# Patient Record
Sex: FEMALE | Race: BLACK OR AFRICAN AMERICAN | Employment: FULL TIME | URBAN - METROPOLITAN AREA
[De-identification: names, ages, dates, MRNs, and addresses within clinical notes are randomized per-mention and may not be internally consistent; named-entity substitution may affect disease eponyms.]

---

## 2017-02-25 ENCOUNTER — APPOINTMENT (OUTPATIENT)
Dept: ULTRASOUND IMAGING | Age: 32
End: 2017-02-25
Attending: PHYSICIAN ASSISTANT
Payer: SELF-PAY

## 2017-02-25 ENCOUNTER — HOSPITAL ENCOUNTER (EMERGENCY)
Age: 32
Discharge: HOME | End: 2017-02-25
Attending: EMERGENCY MEDICINE | Admitting: EMERGENCY MEDICINE
Payer: SELF-PAY

## 2017-02-25 VITALS
OXYGEN SATURATION: 100 % | DIASTOLIC BLOOD PRESSURE: 61 MMHG | SYSTOLIC BLOOD PRESSURE: 94 MMHG | RESPIRATION RATE: 16 BRPM | TEMPERATURE: 98.3 F | WEIGHT: 153.8 LBS | HEART RATE: 75 BPM | BODY MASS INDEX: 27.25 KG/M2 | HEIGHT: 63 IN

## 2017-02-25 DIAGNOSIS — O20.0 THREATENED ABORTION: Primary | ICD-10-CM

## 2017-02-25 DIAGNOSIS — S31.41XA VAGINAL LACERATION, INITIAL ENCOUNTER: ICD-10-CM

## 2017-02-25 DIAGNOSIS — D64.9 ANEMIA, UNSPECIFIED: ICD-10-CM

## 2017-02-25 LAB
ABO + RH BLD: NORMAL
ALBUMIN SERPL BCP-MCNC: 3.2 G/DL (ref 3.5–5)
ALBUMIN/GLOB SERPL: 0.9 {RATIO} (ref 1.1–2.2)
ALP SERPL-CCNC: 47 U/L (ref 45–117)
ALT SERPL-CCNC: 11 U/L (ref 12–78)
ANION GAP BLD CALC-SCNC: 7 MMOL/L (ref 5–15)
APPEARANCE UR: CLEAR
AST SERPL W P-5'-P-CCNC: 7 U/L (ref 15–37)
BACTERIA URNS QL MICRO: NEGATIVE /HPF
BASOPHILS # BLD AUTO: 0 K/UL (ref 0–0.1)
BASOPHILS # BLD: 0 % (ref 0–1)
BILIRUB SERPL-MCNC: 0.2 MG/DL (ref 0.2–1)
BILIRUB UR QL: NEGATIVE
BLOOD BANK CMNT PATIENT-IMP: NORMAL
BUN SERPL-MCNC: 7 MG/DL (ref 6–20)
BUN/CREAT SERPL: 16 (ref 12–20)
CALCIUM SERPL-MCNC: 8.5 MG/DL (ref 8.5–10.1)
CHLORIDE SERPL-SCNC: 102 MMOL/L (ref 97–108)
CO2 SERPL-SCNC: 25 MMOL/L (ref 21–32)
COLOR UR: ABNORMAL
CREAT SERPL-MCNC: 0.45 MG/DL (ref 0.55–1.02)
EOSINOPHIL # BLD: 0.1 K/UL (ref 0–0.4)
EOSINOPHIL NFR BLD: 1 % (ref 0–7)
EPITH CASTS URNS QL MICRO: ABNORMAL /LPF
ERYTHROCYTE [DISTWIDTH] IN BLOOD BY AUTOMATED COUNT: 16.5 % (ref 11.5–14.5)
GLOBULIN SER CALC-MCNC: 3.6 G/DL (ref 2–4)
GLUCOSE SERPL-MCNC: 88 MG/DL (ref 65–100)
GLUCOSE UR STRIP.AUTO-MCNC: NEGATIVE MG/DL
HCG SERPL-ACNC: ABNORMAL MIU/ML (ref 0–6)
HCG UR QL: POSITIVE
HCT VFR BLD AUTO: 29.3 % (ref 35–47)
HGB BLD-MCNC: 9.4 G/DL (ref 11.5–16)
HGB UR QL STRIP: ABNORMAL
HYALINE CASTS URNS QL MICRO: ABNORMAL /LPF (ref 0–5)
KETONES UR QL STRIP.AUTO: NEGATIVE MG/DL
LEUKOCYTE ESTERASE UR QL STRIP.AUTO: NEGATIVE
LYMPHOCYTES # BLD AUTO: 27 % (ref 12–49)
LYMPHOCYTES # BLD: 2.4 K/UL (ref 0.8–3.5)
MCH RBC QN AUTO: 25.3 PG (ref 26–34)
MCHC RBC AUTO-ENTMCNC: 32.1 G/DL (ref 30–36.5)
MCV RBC AUTO: 79 FL (ref 80–99)
MONOCYTES # BLD: 0.6 K/UL (ref 0–1)
MONOCYTES NFR BLD AUTO: 7 % (ref 5–13)
NEUTS SEG # BLD: 5.9 K/UL (ref 1.8–8)
NEUTS SEG NFR BLD AUTO: 65 % (ref 32–75)
NITRITE UR QL STRIP.AUTO: NEGATIVE
PH UR STRIP: 6.5 [PH] (ref 5–8)
PLATELET # BLD AUTO: 197 K/UL (ref 150–400)
POTASSIUM SERPL-SCNC: 3.5 MMOL/L (ref 3.5–5.1)
PROT SERPL-MCNC: 6.8 G/DL (ref 6.4–8.2)
PROT UR STRIP-MCNC: NEGATIVE MG/DL
RBC # BLD AUTO: 3.71 M/UL (ref 3.8–5.2)
RBC #/AREA URNS HPF: ABNORMAL /HPF (ref 0–5)
SODIUM SERPL-SCNC: 134 MMOL/L (ref 136–145)
SP GR UR REFRACTOMETRY: 1.01 (ref 1–1.03)
UA: UC IF INDICATED,UAUC: ABNORMAL
UROBILINOGEN UR QL STRIP.AUTO: 0.2 EU/DL (ref 0.2–1)
WBC # BLD AUTO: 9.1 K/UL (ref 3.6–11)
WBC URNS QL MICRO: ABNORMAL /HPF (ref 0–4)

## 2017-02-25 PROCEDURE — 85025 COMPLETE CBC W/AUTO DIFF WBC: CPT | Performed by: PHYSICIAN ASSISTANT

## 2017-02-25 PROCEDURE — 81001 URINALYSIS AUTO W/SCOPE: CPT | Performed by: EMERGENCY MEDICINE

## 2017-02-25 PROCEDURE — 86900 BLOOD TYPING SEROLOGIC ABO: CPT | Performed by: PHYSICIAN ASSISTANT

## 2017-02-25 PROCEDURE — 80053 COMPREHEN METABOLIC PANEL: CPT | Performed by: PHYSICIAN ASSISTANT

## 2017-02-25 PROCEDURE — 36415 COLL VENOUS BLD VENIPUNCTURE: CPT | Performed by: PHYSICIAN ASSISTANT

## 2017-02-25 PROCEDURE — 99284 EMERGENCY DEPT VISIT MOD MDM: CPT

## 2017-02-25 PROCEDURE — 76801 OB US < 14 WKS SINGLE FETUS: CPT

## 2017-02-25 PROCEDURE — 84702 CHORIONIC GONADOTROPIN TEST: CPT | Performed by: PHYSICIAN ASSISTANT

## 2017-02-25 PROCEDURE — 74011250637 HC RX REV CODE- 250/637: Performed by: PHYSICIAN ASSISTANT

## 2017-02-25 PROCEDURE — 81025 URINE PREGNANCY TEST: CPT

## 2017-02-25 RX ORDER — ACETAMINOPHEN 325 MG/1
650 TABLET ORAL
Status: COMPLETED | OUTPATIENT
Start: 2017-02-25 | End: 2017-02-25

## 2017-02-25 RX ADMIN — ACETAMINOPHEN 650 MG: 325 TABLET, FILM COATED ORAL at 20:58

## 2017-02-25 NOTE — ED TRIAGE NOTES
TRIAGE NOTE:  Patient arrives with c/o \"i'm about 3 months pregnant in march, and today I found some light spotting and clotting and i'm concerned\". Patient reports cramping to lower abdomen and back.

## 2017-02-26 NOTE — ED NOTES
Pt given discharge instructions will use neosporin on laceration and OTC prenatal vitamins, VSS, ambulates out to car with boyfriend.

## 2017-02-26 NOTE — ED PROVIDER NOTES
HPI Comments: 26 yo AAF ~ 3 months pregnant per patient, LMP 11/28/16 presenting ambulatory to the Ed with complaint of vaginal spotting with small clots noticed today. Associated lower back pressure and pressure with urinating. No prenatal care for pregnancy yet. Started after intercourse. No fever, chills, headache, chest pain, SOB, abdominal pain, dysuria, frequency, or urinary retention. Patient is a 27 y.o. female presenting with pregnancy problem. The history is provided by the patient. Pregnancy Problem    Associated symptoms include back pain. Pertinent negatives include no fever, no diarrhea, no nausea, no vomiting, no constipation, no frequency, no headaches, no arthralgias and no chest pain. Past Medical History:   Diagnosis Date    Anemia        History reviewed. No pertinent surgical history. History reviewed. No pertinent family history. Social History     Social History    Marital status: SINGLE     Spouse name: N/A    Number of children: N/A    Years of education: N/A     Occupational History    Not on file. Social History Main Topics    Smoking status: Never Smoker    Smokeless tobacco: Not on file    Alcohol use No    Drug use: Not on file    Sexual activity: Not on file     Other Topics Concern    Not on file     Social History Narrative    No narrative on file         ALLERGIES: Review of patient's allergies indicates no known allergies. Review of Systems   Constitutional: Negative. Negative for chills, fatigue and fever. HENT: Negative. Negative for congestion, ear pain, facial swelling, rhinorrhea, sneezing and sore throat. Eyes: Negative for pain, discharge and itching. Respiratory: Negative for cough, chest tightness and shortness of breath. Cardiovascular: Negative. Negative for chest pain and leg swelling. Gastrointestinal: Negative. Negative for abdominal distention, abdominal pain, constipation, diarrhea, nausea and vomiting. Genitourinary: Positive for vaginal bleeding. Negative for difficulty urinating, frequency and urgency. Musculoskeletal: Positive for back pain. Negative for arthralgias, joint swelling, neck pain and neck stiffness. Skin: Negative for color change and rash. Neurological: Negative for dizziness, numbness and headaches. Psychiatric/Behavioral: Negative for confusion and decreased concentration. All other systems reviewed and are negative. Vitals:    02/25/17 1756   BP: 121/70   Pulse: 89   Resp: 18   Temp: 98.6 °F (37 °C)   SpO2: 100%   Weight: 69.8 kg (153 lb 12.8 oz)   Height: 5' 3\" (1.6 m)            Physical Exam   Constitutional: She is oriented to person, place, and time. She appears well-developed and well-nourished. No distress. Well appearing AAF seated in NAD   HENT:   Head: Normocephalic and atraumatic. Right Ear: External ear normal.   Left Ear: External ear normal.   Nose: Nose normal.   Mouth/Throat: Oropharynx is clear and moist. No oropharyngeal exudate. Eyes: Conjunctivae and EOM are normal. Pupils are equal, round, and reactive to light. Right eye exhibits no discharge. Left eye exhibits no discharge. No scleral icterus. Neck: Normal range of motion. Neck supple. Cardiovascular: Normal rate and regular rhythm. Exam reveals no gallop and no friction rub. No murmur heard. Pulmonary/Chest: Effort normal and breath sounds normal. She has no wheezes. She has no rales. Abdominal: Soft. Bowel sounds are normal. She exhibits no distension. There is no tenderness. There is no rebound and no guarding. Gravid     Genitourinary:       Cervix exhibits no motion tenderness and no discharge. Right adnexum displays no mass. Left adnexum displays no tenderness. No erythema or bleeding in the vagina. There are signs of injury in the vagina. No vaginal discharge found. Neurological: She is alert and oriented to person, place, and time. No cranial nerve deficit.  Coordination normal.   Skin: Skin is warm and dry. She is not diaphoretic. Psychiatric: She has a normal mood and affect. Her behavior is normal.   Nursing note and vitals reviewed. MDM  Number of Diagnoses or Management Options  Anemia, unspecified:   Threatened :   Vaginal laceration, initial encounter:   Diagnosis management comments: 28 yo G1 `12 wk gestation female with complaint of vaginal bleeding . Abrasion noted to outer labia on exam. No internal bleeding. Will check labs and US. [facility] [place]      ED Course       Procedures    Progress note    Labs and imaging reviewed. Pt reports chronic anemia. Pt will start prenatal vitamin. [facility] [place]    Patient's results have been reviewed with them. Patient and/or family have verbally conveyed their understanding and agreement of the patient's signs, symptoms, diagnosis, treatment and prognosis and additionally agree to follow up as recommended or return to the Emergency Room should their condition change prior to follow-up. Discharge instructions have also been provided to the patient with some educational information regarding their diagnosis as well a list of reasons why they would want to return to the ER prior to their follow-up appointment should their condition change. Chanelle Isaacs [place]    A/P  Vaginal abrasion/anemia/threatened AB: Start a prenatal vitamin. Establish OB care. Bacitracin to the vagina abrasion. Return for any new or worsening.  Chanelle Solomon [place]

## 2017-02-26 NOTE — DISCHARGE INSTRUCTIONS
We hope that we have addressed all of your medical concerns. The examination and treatment you received in the Emergency Department were for an emergent problem and were not intended as complete care. It is important that you follow up with your healthcare provider(s) for ongoing care. If your symptoms worsen or do not improve as expected, and you are unable to reach your usual health care provider(s), you should return to the Emergency Department. Today's healthcare is undergoing tremendous change, and patient satisfaction surveys are one of the many tools to assess the quality of medical care. You may receive a survey from the [employer] regarding your experience in the Emergency Department. I hope that your experience has been completely positive, particularly the medical care that I provided. As such, please participate in the survey; anything less than excellent does not meet my expectations or intentions. [facility] and [facility] participate in nationally recognized quality of care measures. If your blood pressure is greater than 120/80, as reported below, we urge that you seek medical care to address the potential of high blood pressure, commonly known as hypertension. Hypertension can be hereditary or can be caused by certain medical conditions, pain, stress, or \"white coat syndrome. \"       Please make an appointment with your health care provider(s) for follow up of your Emergency Department visit. VITALS:   Patient Vitals for the past 8 hrs:   Temp Pulse Resp BP SpO2   02/25/17 2019 98.3 °F (36.8 °C) 75 16 94/61 100 %   02/25/17 1756 98.6 °F (37 °C) 89 18 121/70 100 %          Thank you for allowing us to provide you with medical care today. We realize that you have many choices for your emergency care needs. Please choose us in the future for any continued health care needs. Regards,           April LOLA Isaacs, [address]   Office: [phone]            Recent Results (from the past 24 hour(s))   URINALYSIS W/ REFLEX CULTURE    Collection Time: 02/25/17  6:32 PM   Result Value Ref Range    Color YELLOW/STRAW      Appearance CLEAR CLEAR      Specific gravity 1.013 1.003 - 1.030      pH (UA) 6.5 5.0 - 8.0      Protein NEGATIVE  NEG mg/dL    Glucose NEGATIVE  NEG mg/dL    Ketone NEGATIVE  NEG mg/dL    Bilirubin NEGATIVE  NEG      Blood TRACE (A) NEG      Urobilinogen 0.2 0.2 - 1.0 EU/dL    Nitrites NEGATIVE  NEG      Leukocyte Esterase NEGATIVE  NEG      WBC 0-4 0 - 4 /hpf    RBC 0-5 0 - 5 /hpf    Epithelial cells FEW FEW /lpf    Bacteria NEGATIVE  NEG /hpf    UA:UC IF INDICATED CULTURE NOT INDICATED BY UA RESULT CNI      Hyaline cast 0-2 0 - 5 /lpf   HCG URINE, QL. - POC    Collection Time: 02/25/17  6:34 PM   Result Value Ref Range    Pregnancy test,urine (POC) POSITIVE (A) NEG     TOTAL HCG, QT. Collection Time: 02/25/17  8:10 PM   Result Value Ref [place]    [place] [address] (H) 0 - 6 MIU/ML   CBC WITH AUTOMATED DIFF    Collection Time: 02/25/17  8:10 PM   Result Value Ref Range    WBC 9.1 3.6 - 11.0 K/uL    RBC 3.71 (L) 3.80 - 5.20 M/uL    HGB 9.4 (L) 11.5 - 16.0 g/dL    HCT 29.3 (L) 35.0 - 47.0 %    MCV 79.0 (L) 80.0 - 99.0 FL    MCH 25.3 (L) 26.0 - 34.0 PG    MCHC 32.1 30.0 - 36.5 g/dL    RDW 16.5 (H) 11.5 - 14.5 %    PLATELET 905 555 - 866 K/uL    NEUTROPHILS 65 32 - 75 %    LYMPHOCYTES 27 12 - 49 %    MONOCYTES 7 5 - 13 %    EOSINOPHILS 1 0 - 7 %    BASOPHILS 0 0 - 1 %    ABS. NEUTROPHILS 5.9 1.8 - 8.0 K/UL    ABS. LYMPHOCYTES 2.4 0.8 - 3.5 K/UL    ABS. MONOCYTES 0.6 0.0 - 1.0 K/UL    ABS. EOSINOPHILS 0.1 0.0 - 0.4 K/UL    ABS.  BASOPHILS 0.0 0.0 - 0.1 K/UL   METABOLIC PANEL, COMPREHENSIVE    Collection Time: 02/25/17  8:10 PM   Result Value Ref Range    Sodium 134 (L) 136 - 145 mmol/L    Potassium 3.5 3.5 - 5.1 mmol/L    Chloride 102 97 - 108 mmol/L    CO2 25 21 - 32 mmol/L Anion gap 7 5 - 15 mmol/L    Glucose 88 65 - 100 mg/dL    BUN 7 6 - 20 MG/DL    Creatinine 0.45 (L) 0.55 - 1.02 MG/DL    BUN/Creatinine ratio 16 12 - 20      GFR est AA >60 >60 ml/min/1.73m2    GFR est non-AA >60 >60 ml/min/1.73m2    Calcium 8.5 8.5 - 10.1 MG/DL    Bilirubin, total 0.2 0.2 - 1.0 MG/DL    ALT (SGPT) 11 (L) 12 - 78 U/L    AST (SGOT) 7 (L) 15 - 37 U/L    Alk. phosphatase 47 45 - 117 U/L    Protein, total 6.8 6.4 - 8.2 g/dL    Albumin 3.2 (L) 3.5 - 5.0 g/dL    Globulin 3.6 2.0 - 4.0 g/dL    A-G Ratio 0.9 (L) 1.1 - 2.2     TYPE, ABO & RH    Collection Time: 02/25/17  8:10 PM   Result Value Ref Range    ABO/Rh(D) O POSITIVE     Comment SAMPLE NOT USABLE FOR CROSSMATCH        Us Preg Uts < 14 Wks Sngl    Result Date: 2/25/2017  EXAM: US OB EVAL SINGLE GESTATION LESS THAN 14 WEEKS INDICATION: Pregnant with light vaginal spotting and coughing. Cramping lower abdominal and back pain. COMPARISON: TECHNIQUE: Transabdominal ultrasound of the pelvis. hCG: Not available FINDINGS:  Transabdominal: The uterus measures: 12.7 x 9.1 x 8.8 cm The endometrium contains an oval gestational sac measuring 4.2 x 5.8 x 7.3 cm corresponding with an estimated gestational age of 5 weeks 6 days. The gestational sac containing a fetus with a crown-rump length measuring 6.1 cm corresponding with an estimated gestational age of 16 weeks 4 days. There is a fetal heart rate of 162 bpm. The placenta is anterior. Amniotic fluid is within normal limits. The ovaries are not visualized gravid uterus and bowel gas. The urinary bladder is minimally distended. IMPRESSION: Single viable intrauterine pregnancy with estimated gestational age of 16 weeks 4 days. Nonvisualized ovaries. Threatened Miscarriage: Care Instructions  Your Care Instructions    Some women have light spotting or bleeding during the first 12 weeks of pregnancy.  In some cases this is normal. Light spotting or bleeding can also be a sign of a possible loss of the pregnancy. This is called a threatened miscarriage. At this point, the doctor may not be able to tell if your vaginal bleeding is normal or is a sign of a miscarriage. In early pregnancy, things such as stress, exercise, and sex do not cause miscarriage. You may be worried or upset about the possibility of losing your pregnancy. But do not blame yourself. There is no treatment to stop a threatened miscarriage. If you do have a miscarriage, there was nothing you could have done to prevent it. A miscarriage usually means that the pregnancy is not developing normally. The doctor has checked you carefully, but problems can develop later. If you notice any problems or new symptoms, get medical treatment right away. Follow-up care is a key part of your treatment and safety. Be sure to make and go to all appointments, and call your doctor if you are having problems. It's also a good idea to know your test results and keep a list of the medicines you take. How can you care for yourself at home? · If you do have a miscarriage, you will probably have some vaginal bleeding for 1 to 2 weeks. Use pads instead of tampons. · Take acetaminophen (Tylenol) for cramps. Read and follow all instructions on the label. · Do not take two or more pain medicines at the same time unless the doctor told you to. Many pain medicines have acetaminophen, which is Tylenol. Too much acetaminophen (Tylenol) can be harmful. · Do not have sex until your doctor says it is okay. · Get lots of rest over the next several days. · You may do your normal activities if you feel well enough to do them. But do not do any heavy exercise until your doctor says it is okay. · Eat a balanced diet that is high in iron and vitamin C. Foods rich in iron include red meat, shellfish, eggs, beans, and leafy green vegetables. Foods high in vitamin C include citrus fruits, tomatoes, and broccoli.  Talk to your doctor about whether you need to take iron pills or a multivitamin. · Do not drink alcohol or use tobacco or illegal drugs. · Do not smoke. If you need help quitting, talk to your doctor about stop-smoking programs and medicines. These can increase your chances of quitting for good. When should you call for help? Call 911 anytime you think you may need emergency care. For example, call if:  · You have sudden, severe pain in your belly or pelvis. · You passed out (lost consciousness). · You have severe vaginal bleeding. Call your doctor now or seek immediate medical care if:  · You are dizzy or lightheaded, or you feel like you may faint. · You have new or increased pain in your belly or pelvis. · Your vaginal bleeding is getting worse. · You have increased pain in the vaginal area. · You have a fever. · You think you may have passed tissue. Save any tissue that you pass. Take it to your doctor's office as soon as you can. Watch closely for changes in your health, and be sure to contact your doctor if:  · You have new or worse vaginal discharge. · You do not get better as expected. Where can you learn more? Go to http://benita-chris.info/. Enter T060 in the search box to learn more about \"Threatened Miscarriage: Care Instructions. \"  Current as of: May 30, 2016  Content Version: 11.1  © 1940-4068 [employer]. Care instructions adapted under license by [employer] (which disclaims liability or warranty for this information). If you have questions about a medical condition or this instruction, always ask your healthcare professional. [place] [address] any warranty or liability for your use of this information.

## 2017-02-26 NOTE — ED NOTES
Pelvic exam completed by GLADYS Roca. Small tear seen on left side of labia, slight erythema, no bleeding noted at this time.

## 2019-07-27 ENCOUNTER — TELEPHONE (OUTPATIENT)
Dept: INTERNAL MEDICINE CLINIC | Age: 34
End: 2019-07-27